# Patient Record
Sex: FEMALE | Race: AMERICAN INDIAN OR ALASKA NATIVE
[De-identification: names, ages, dates, MRNs, and addresses within clinical notes are randomized per-mention and may not be internally consistent; named-entity substitution may affect disease eponyms.]

---

## 2019-11-08 ENCOUNTER — HOSPITAL ENCOUNTER (OUTPATIENT)
Dept: HOSPITAL 5 - SPVWC | Age: 79
Discharge: HOME | End: 2019-11-08
Attending: PHYSICIAN ASSISTANT
Payer: COMMERCIAL

## 2019-11-08 DIAGNOSIS — M81.0: Primary | ICD-10-CM

## 2019-11-08 PROCEDURE — 77080 DXA BONE DENSITY AXIAL: CPT

## 2019-11-11 NOTE — MAMMOGRAPHY REPORT
BONE DEXA



CLINICAL: Postmenopausal. No comparison.



TECHNIQUE: 2 site bone DEXA performed on an Hologic scanner.



FINDINGS:



The average BMD of the lumbar spine L1-L4 is 1.554g/cm squared with a T score of +3.7 and a Z score o
f +6.5. 

Moderate endplate sclerosis at L1 to an L3-4.





The average total BMD of the right hip is 1.082 g/cm squared with a T score of +0.3and a Z score of +
1.8. 





IMPRESSION:

1. WHO classification: Normal with average fracture risk based on spine measurements.

2. WHO classification Normal with average fracture risk based on right hip measurements.



RECOMMENDATION: Clinical correlation and routine screening.



Definitions:

BMD equal bone mineral density

T score = BMD related to peak bone mass of young adult ([place] expressed an standard deviation)

Z score = age-matched BMD expressed in SD

World health organization (WHO) diagnostic criteria

Normal T score greater than equal to 1 standard deviation

Osteopenia T score between -1 and -2.4 standard deviation

Osteoporosis T score -2.5 standard deviation or below.



Note: BMD is not the only risk factor for fracture; also consider factors such as the patient's age, 
risk of falling, previous osteoporotic fracture, family history of osteoporotic fractures, current sm
oker and low body weight.



Z scores are not calculated if greater than 80 years of age.



Signer Name: Scot Calhoun MD 

Signed: 11/11/2019 1:20 PM

 Workstation Name: [id number]

## 2020-05-26 ENCOUNTER — HOSPITAL ENCOUNTER (OUTPATIENT)
Dept: HOSPITAL 5 - SPVWC | Age: 80
Discharge: HOME | End: 2020-05-26
Attending: PHYSICIAN ASSISTANT
Payer: MEDICARE

## 2020-05-26 DIAGNOSIS — Z12.31: Primary | ICD-10-CM

## 2020-05-26 PROCEDURE — 77067 SCR MAMMO BI INCL CAD: CPT

## 2020-05-27 NOTE — MAMMOGRAPHY REPORT
DIGITAL SCREENING MAMMOGRAM WITH CAD, 5/26/2020



INDICATION: Routine screening mammography. 



TECHNIQUE:  Digital bilateral  2D mammography was obtained in the craniocaudal and mediolateral obliq
ue projections. This examination was interpreted with the benefit of Computer-Aided Detection analysi
s.



COMPARISON: None available. However, she indicated she had had a prior mammogram in [place], [place]
.



FINDINGS: 



Breast Density: The breasts are heterogeneously dense, which may obscure small masses.



Left parenchymal asymmetries require comparison with prior mammogram or additional imaging. No cathryn
ectural distortion or suspicious calcifications of the left breast. There is no evidence of dominant 
mass, suspicious calcifications or architectural distortion in the right breast.



IMPRESSION: Comparison with a previous mammogram is recommended. We will attempt to obtain a prior ma
mmogram for comparison. If we do not obtain a prior mammogram within 30 days, a revised report will b
e issued recommending a recall for additional imaging. Please be advised that the patient should not 
schedule an appointment for return until adequate time (at least 2 weeks) has passed for us to obtain
 the prior mammogram.



Follow up recommendation: Obtain prior study for comparison



Category 0: Incomplete. Needs additional imaging evaluation and/or prior mammograms for comparison.



A "normal" or negative report should not discourage follow up or biopsy of a clinically significant f
inding.



A written summary of these findings will be mailed to the patient. The patient will be entered into a
 mammography reporting system which will generate a reminder letter for the patient's next appointmen
t at the appropriate interval.



The American College of Radiology recommends yearly mammograms starting at age 40 and continuing as l
shea as a woman is in good health.  Breast MRI is recommended for women with an approximate 20-25% or 
greater lifetime risk of breast cancer, including women with a strong family history of breast or ova
crista cancer or who have been treated for Hodgkin's disease.



Signer Name: Scot Calhoun MD 

Signed: 5/27/2020 8:52 AM

Workstation Name: [id number]

## 2020-07-30 ENCOUNTER — HOSPITAL ENCOUNTER (OUTPATIENT)
Dept: HOSPITAL 5 - SPVWC | Age: 80
Discharge: HOME | End: 2020-07-30
Attending: FAMILY MEDICINE
Payer: MEDICARE

## 2020-07-30 DIAGNOSIS — R92.8: Primary | ICD-10-CM

## 2020-07-30 NOTE — MAMMOGRAPHY REPORT
DIGITAL DIAGNOSTIC MAMMOGRAM WITH CAD, 7/30/2020



INDICATION:  Abnormal screening mammogram. Screening recall of the left breast



TECHNIQUE:  Digital left mammographic imaging was performed. Spot compression views were obtained.

This examination was interpreted with the benefit of Computer-aided Detection analysis. 



COMPARISON: Screening mammogram, 5/26/2020, 7/17/2014, 7/1/2013 and 5/21/2012



FINDINGS: 



Breast Density: The breasts are heterogeneously dense, which may obscure small masses.



Spot compression views of the left breast demonstrate resolution of the previously noted densities at
 the 12:00 position and throughout the upper outer quadrant. The appearance of the breast parenchyma 
is unchanged when compared to multiple prior mammograms. There has been no significant interval cardona
e. Biopsy clip is noted in the left retroareolar position.



IMPRESSION:



Follow up recommendation: Routine yearly



BI-RADS Category 2:  Benign.



A "normal" or negative report should not discourage follow up or biopsy of a clinically significant f
inding.



A written summary of these findings will be mailed to the patient. The patient will be entered into a
 mammography reporting system which will generate a reminder letter for the patient's next appointmen
t at the appropriate interval.



According to the American College of Radiology, yearly mammograms are recommended starting at age 40 
and continuing as long as a woman is in good health.  Breast MRI is recommended for women with an ana
roximately 20-25% or greater lifetime risk of breast cancer, including women with a strong family his
tory of breast or ovarian cancer and women who have been treated for Hodgkin's disease.



Signer Name: Briana Tineo MD 

Signed: 7/30/2020 3:34 PM

Workstation Name: [email or web address]

## 2021-05-27 ENCOUNTER — HOSPITAL ENCOUNTER (OUTPATIENT)
Dept: HOSPITAL 5 - SPVWC | Age: 81
Discharge: HOME | End: 2021-05-27
Attending: PHYSICIAN ASSISTANT
Payer: MEDICARE

## 2021-05-27 DIAGNOSIS — Z12.31: Primary | ICD-10-CM

## 2021-05-27 PROCEDURE — 77067 SCR MAMMO BI INCL CAD: CPT

## 2021-05-27 NOTE — MAMMOGRAPHY REPORT
DIGITAL SCREENING MAMMOGRAM WITH CAD, 5/27/2021



CLINICAL INFORMATION / INDICATION: Routine screening mammography. SCREENING MAMMO



TECHNIQUE:  Digital bilateral 2D mammography was obtained in the craniocaudal and mediolateral obliqu
e projections. This examination was interpreted with the benefit of Computer-Aided Detection analysis
.



COMPARISON: 4/21/2010 through 5/26/2020.



FINDINGS: 



Breast Density: The breasts are heterogeneously dense, which may obscure small masses.



No dominant mass, suspicious calcifications, or architectural distortion in either breast. 



Benign-appearing nodularity and calcifications are present bilaterally. There is a left biopsy clip. 
No new abnormality is seen.

 

IMPRESSION: No mammographic evidence of malignancy.



Follow up recommendation: Routine yearly



BI-RADS Category 2:  Benign.





-------------------------------------------------------------------------------------------

A "normal" or negative report should not discourage follow up or biopsy of a clinically significant f
inding.



A written summary of these findings will be mailed to the patient. The patient will be entered into a
 mammography reporting system which will generate a reminder letter for the patient's next appointmen
t at the appropriate interval.



The American College of Radiology recommends yearly mammograms starting at age 40 and continuing as l
shea as a woman is in good health.  Breast MRI is recommended for women with an approximate 20-25% or 
greater lifetime risk of breast cancer, including women with a strong family history of breast or ova
crista cancer or who have been treated for Hodgkin's disease.



Signer Name: Hamilton Barrientos MD 

Signed: 5/27/2021 9:57 AM

Workstation Name: [email or web address]

## 2021-09-09 ENCOUNTER — HOSPITAL ENCOUNTER (OUTPATIENT)
Dept: HOSPITAL 5 - MRI | Age: 81
Discharge: HOME | End: 2021-09-09
Payer: MEDICARE
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
## 2021-09-09 DIAGNOSIS — M75.102: ICD-10-CM

## 2021-09-09 DIAGNOSIS — S43.402A: Primary | ICD-10-CM
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
## 2021-09-09 DIAGNOSIS — M25.412: ICD-10-CM

## 2021-09-09 NOTE — MAGNETIC RESONANCE REPORT
MRI LEFT SHOULDER WITHOUT CONTRAST



INDICATION / CLINICAL INFORMATION: ROTATOR CUFF PAIN, .



TECHNIQUE: Multiplanar, multisequence MR images were obtained. No contrast used.



COMPARISON: None available.



FINDINGS:

SUPRASPINATUS: Full-thickness tear of the distal anterior supraspinatus with retraction of fibers to 
the medial humeral head. Moderate to severe atrophy of the muscle belly.

INFRASPINATUS: Severe tendinosis with thinning but no discrete tear. There is also muscle belly atrop
hy.

SUBSCAPULARIS: Partial-thickness articular sided tear of superior one third fibers.

BICEPS TENDON, LONG HEAD: Biceps tendon is perched on the lesser tuberosity with tendinosis.



GLENOID LABRUM: Circumferential degenerative tearing.

ARTICULAR CARTILAGE: Advanced degenerative arthrosis. Glenohumeral joint.

JOINT SPACE / CAPSULE: Large joint effusion.



ACROMION / A.C. JOINT: Advanced degenerative changes of the acromioclavicular joint with undersurface
 osteophytes of the acromion.

SUBACROMIAL/SUBDELTOID SPACE: No significant abnormality.



BONES:  Subcortical degenerative changes and osteophytes of the humeral head and glenoid. No acute fr
acture. No aggressive osseous lesion.



SOFT TISSUES: No significant abnormality.



ADDITIONAL FINDINGS: None.



IMPRESSION:

1. 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

2. Full-thickness tear of supraspinatus with retraction and muscle belly atrophy.

3. Partial-thickness tear of the superior fibers of subscapularis. The biceps tendon is slightly disl
ocated centrally.

4. Circumferential maceration of the labrum.

5. Large joint effusion.



==================================================================

Report dictated by: Iker Peoples MD 

Report dictated on: 9/9/2021 9:16 AM



I have reviewed the images, agree with this report, and edited this report as needed.

 



Signer Name: Oscar Sin MD 

Signed: 9/9/2021 11:29 AM

Workstation Name: [email or web address]

## 2022-09-22 ENCOUNTER — HOSPITAL ENCOUNTER (OUTPATIENT)
Dept: HOSPITAL 5 - SPVWC | Age: 82
Discharge: HOME | End: 2022-09-22
Attending: PHYSICIAN ASSISTANT
Payer: MEDICARE

## 2022-09-22 DIAGNOSIS — M81.0: ICD-10-CM

## 2022-09-22 DIAGNOSIS — Z12.31: Primary | ICD-10-CM

## 2022-09-22 PROCEDURE — 77080 DXA BONE DENSITY AXIAL: CPT

## 2022-09-22 PROCEDURE — 77067 SCR MAMMO BI INCL CAD: CPT

## 2022-09-22 NOTE — MAMMOGRAPHY REPORT
DEXA BONE DENSITY SCAN



INDICATION / CLINICAL INFORMATION: OSTEOPOROSIS M81.0.

82 years Female



COMPARISON: 2019



LUMBAR SPINE, L1-L4:

- Bone mineral density (BMD) = 1.533 g/cm2.

- T-score = 3.5 

- Change (%) since most recent prior (if available):  -1.3



FEMUR, NECK RIGHT

- Bone mineral density (BMD) = 0.943 g/cm2.

- T-score = -0.1 

- Change (%) since most recent prior (if available):  -1.3







IMPRESSION:

1. WHO Classification: Normal bone density. Fracture Risk: Not Increased. 

2. 10-Year Fracture Risk (FRAX) = Major Osteoporotic Not reported.% / Hip: Not reported.%





FRAX generally not reported for patients with normal or osteoporotic BMD, in non-steroid-treated judit
ents younger than age 50, or in patients undergoing pharmacotherapy







=================================================================

BMD Reporting Guidelines (ISCD, 2015)

=================================================================

BMD Reporting in Postmenopausal Women and in Men Age 50 and Older

- T-scores are preferred.

- The WHO densitometric classification is applicable.



BMD Reporting in Females Prior to Menopause and in Males Younger Than Age 50

- Z-scores, not T-scores, are preferred. This is particularly important in children.

- A Z-score of -2.0 or lower is defined as below the expected range for age, and a Z-score above -2.0
 is within the expected range for age.

- Osteoporosis cannot be diagnosed in men under age 50 on the basis of BMD alone.

- The WHO diagnostic criteria may be applied to women in the menopausal transition.





http://www.iscd.org/official-positions/8893-bcpd-fvzbdhew-positions-adult/





Signer Name: Alessandra Rangel MD 

Signed: 9/22/2022 11:47 AM

Workstation Name: [id number]

## 2022-09-26 NOTE — MAMMOGRAPHY REPORT
DIGITAL SCREENING MAMMOGRAM WITH CAD, 9/22/2022



CLINICAL INFORMATION / INDICATION: Routine screening mammography.



TECHNIQUE:  Digital bilateral 2D mammography was obtained in the craniocaudal and mediolateral obliqu
e projections. This examination was interpreted with the benefit of Computer-Aided Detection analysis
.



COMPARISON: 5/26/2020, 5/27/2021



FINDINGS: 



Breast Density: The breasts are heterogeneously dense, which may obscure small masses.



No dominant mass, suspicious calcifications, or architectural distortion in either breast. 



No interval change.

 

IMPRESSION: No mammographic evidence of malignancy.



Follow up recommendation: Routine yearly screening mammogram.



BI-RADS Category 1:  NEGATIVE





-------------------------------------------------------------------------------------------

A "normal" or negative report should not discourage follow up or biopsy of a clinically significant f
inding.



A written summary of these findings will be mailed to the patient. The patient will be entered into a
 mammography reporting system which will generate a reminder letter for the patient's next appointmen
t at the appropriate interval.



The American College of Radiology recommends yearly mammograms starting at age 40 and continuing as l
shea as a woman is in good health.  Breast MRI is recommended for women with an approximate 20-25% or 
greater lifetime risk of breast cancer, including women with a strong family history of breast or ova
crista cancer or who have been treated for Hodgkin's disease.



Signer Name: Marilee Shahid MD 

Signed: 9/26/2022 8:22 AM

Workstation Name: [id number]